# Patient Record
Sex: FEMALE | Race: WHITE | Employment: FULL TIME | URBAN - METROPOLITAN AREA
[De-identification: names, ages, dates, MRNs, and addresses within clinical notes are randomized per-mention and may not be internally consistent; named-entity substitution may affect disease eponyms.]

---

## 2022-03-18 PROBLEM — Z30.431 ENCOUNTER FOR ROUTINE CHECKING OF INTRAUTERINE CONTRACEPTIVE DEVICE (IUD): Status: ACTIVE | Noted: 2020-01-27

## 2022-03-18 PROBLEM — T83.32XA IUD THREADS LOST: Status: ACTIVE | Noted: 2021-01-27

## 2022-03-20 PROBLEM — N92.0 MENORRHAGIA: Status: ACTIVE | Noted: 2020-01-27

## 2023-03-01 ENCOUNTER — OFFICE VISIT (OUTPATIENT)
Dept: NEUROLOGY | Age: 53
End: 2023-03-01
Payer: COMMERCIAL

## 2023-03-01 VITALS
SYSTOLIC BLOOD PRESSURE: 152 MMHG | RESPIRATION RATE: 16 BRPM | DIASTOLIC BLOOD PRESSURE: 80 MMHG | HEIGHT: 67 IN | TEMPERATURE: 97.2 F | OXYGEN SATURATION: 99 % | HEART RATE: 100 BPM | BODY MASS INDEX: 33.52 KG/M2

## 2023-03-01 DIAGNOSIS — R20.2 PARESTHESIA OF BOTH FEET: Primary | ICD-10-CM

## 2023-03-01 DIAGNOSIS — R20.2 PARESTHESIA OF BOTH LEGS: ICD-10-CM

## 2023-03-01 PROCEDURE — 99204 OFFICE O/P NEW MOD 45 MIN: CPT | Performed by: PSYCHIATRY & NEUROLOGY

## 2023-03-01 RX ORDER — ESCITALOPRAM OXALATE 5 MG/1
TABLET ORAL
COMMUNITY
Start: 2023-02-16

## 2023-03-01 NOTE — PROGRESS NOTES
Chief Complaint   Patient presents with    New Patient     Patient presents with numbness and tingling bilateral legs and feet for 1 year,patient's PCP had patient do a brachial index test which was normal. Patient states she was recently put on anxiety medication and feels better now. She is a  and stands a lot, but when the numbness and tingling starts she must sit. Patient is accompanied by her .     Visit Vitals  BP (!) 152/80   Pulse 100   Temp 97.2 °F (36.2 °C)   Resp 16   Ht 5' 7\" (1.702 m)   SpO2 99%   BMI 33.52 kg/m²

## 2023-03-01 NOTE — PROGRESS NOTES
Joanne Davila (1970) is a 45 y.o. female, new patient, here for evaluation of the following     Chief complaint(s):   Chief Complaint   Patient presents with    New Patient     Patient presents with numbness and tingling bilateral legs and feet for 1 year,patient's PCP had patient do a brachial index test which was normal. Patient states she was recently put on anxiety medication and feels better now. She is a  and stands a lot, but when the numbness and tingling starts she must sit. Patient is accompanied by her . HPI: 45 y.o. female      Pt originally referred by her Previous PCP/ Dr Carola Choi in Dec 2022 for evaluation of numbness/ tingling in both feet. Has changed PCPs to [place], [place] and reports to Nurse today that symptoms involve feet and legs. Accompanied by  today     Pt reports for about 1 year had intermittent numbness/ tingling of all toes of both feet. Since Thanksgiving 2022, was constant/ daily when she was up/ moving around, but not when lying in bed or trying to sleep. Says she noticed that when she was in social settings, the feet paresthesias seemed to be worse. Started on Lexapro 2 weeks ago, and says the feet symptoms seem to be reduced since then. Non-diabetic. Denies any hx of excessive alcohol intake. Saw Podiatrist and tried changing shoes with better arch support but didn't improve symptoms.      Reviewed labs from 1/24/2022  CMP with mild elevated glucose 109, otherwise normal  CBC normal  Lipid panel: tchol 182, Trig 74, HDL 56,   A1c 5.4  TSH 2.70  Vitamin D 53.9  UA positive for UTI    Reviewed more recent labs 12/7/2022  CBC normal, magnesium 2.2, CMP with minimal elevated glucose 106, BNP 88      Review of Systems: Anxiety, high blood pressure, fatigue    ==================================================    No Known Allergies    Current Outpatient Medications   Medication Sig Dispense Refill    escitalopram oxalate (LEXAPRO) 5 mg tablet TAKE 1 TABLET BY MOUTH EVERY DAY FOR 30 DAYS      lisinopriL (PRINIVIL, ZESTRIL) 40 mg tablet Take 1 Tablet by mouth daily. 80 Tablet 1       Past Medical History:   Diagnosis Date    Hypercholesterolemia     Hypertension     Vitamin D deficiency        Past Surgical History:   Procedure Laterality Date    HX GYN      2        family history includes Heart Disease in her mother. reports that she has never smoked. She has never used smokeless tobacco. She reports current alcohol use of about 1.0 standard drink per week. She reports that she does not use drugs. PHYSICAL EXAM    Vitals:    23 1441   BP: (!) 152/80   Pulse: 100   Temp: 97.2 °F (36.2 °C)   Resp: 16   Height: 5' 7\" (1.702 m)   SpO2: 99%       Awake alert resting, conversant. EOMI. Visual fields are normal bilateral.  Face is symmetric. Hearing and speech are normal.  5 out of 5 strength in all extremities proximal and distal.  No resting or postural tremors. Normal finger-nose-finger on both sides. Intact light touch, pinprick, temperature, vibration in all extremities. Intact proprioception at the toes. Reflexes are 1+ biceps, 2+ left patellar, 1+ right patellar, 1+ Achilles. Stands and ambulates without difficulty. Tandem gait is normal.  Romberg exam is negative    ==================================================    ASSESSMENT/ PLAN:       ICD-10-CM ICD-9-CM    1. Paresthesia of both feet  R20.2 782.0 VITAMIN B12 & FOLATE      VITAMIN B12 & FOLATE      SPEP AND MARY, SERUM      SPEP AND MARY, SERUM      EMG LIMITED      2. Paresthesia of both legs  R20.2 782.0 VITAMIN B12 & FOLATE      VITAMIN B12 & FOLATE      SPEP AND MARY, SERUM      SPEP AND MARY, SERUM      EMG LIMITED        Normal neurologic exam.  Normal motor and sensory exam.  With normal exam, that makes large fiber neuropathy less likely, though doesn't exclude small fiber neuropathy.   Not RLS as symptoms are less when she's off feet and don't bother when lying in bed or trying to go to sleep. Check EMG/ NCS both legs, Vit B12/ Folate/ SPEP    Follow up to go over results      An electronic signature was used to authenticate this note.   -- Grazyna Swartz MD

## 2023-03-20 ENCOUNTER — OFFICE VISIT (OUTPATIENT)
Dept: NEUROLOGY | Age: 53
End: 2023-03-20
Payer: COMMERCIAL

## 2023-03-20 ENCOUNTER — TELEPHONE (OUTPATIENT)
Dept: NEUROLOGY | Age: 53
End: 2023-03-20

## 2023-03-20 DIAGNOSIS — R20.2 PARESTHESIA OF BOTH LEGS: ICD-10-CM

## 2023-03-20 DIAGNOSIS — R20.2 PARESTHESIA OF BOTH FEET: Primary | ICD-10-CM

## 2023-03-20 PROCEDURE — 95911 NRV CNDJ TEST 9-10 STUDIES: CPT | Performed by: PSYCHIATRY & NEUROLOGY

## 2023-03-20 PROCEDURE — 95886 MUSC TEST DONE W/N TEST COMP: CPT | Performed by: PSYCHIATRY & NEUROLOGY

## 2023-03-20 NOTE — PROCEDURES
.        EMG/ NCS Report  [facility]  - [facility]  [facility]  [facility], [address] [place], [address]   Ph: [phone]   FAX: [phone]/ [phone]    Test Date:  3/20/2023    Patient: Jonathan Álvarez : 1970 Physician: Gene Layton M.D. Sex: Female Height: ' \" Ref Phys: Gene Layton M.D.   ID#: [id number] Weight:  lbs. Technician: Beronica Bronson     Patient History:      CC: Numbness, tingling pain fab. feet for the last yr. EMG & NCV Findings:  Evaluation of the left Fibular motor and the right Fibular motor nerves showed normal distal onset latency (L4.1, R4.1 ms), normal amplitude (L4.4, R4.2 mV), normal conduction velocity (B Fib-Ankle, L46, R47 m/s), and normal conduction velocity (Poplt-B Fib, L50, R125 m/s). The left tibial motor and the right tibial motor nerves showed normal distal onset latency (L4.1, R4.7 ms), normal amplitude (L15.5, R16.4 mV), and normal conduction velocity (Knee-Ankle, L42, R49 m/s). The left Sup Fibular sensory, the right Sup Fibular sensory, the left sural sensory, and the right sural sensory nerves showed normal distal peak latency (L2.6, R2.8, L3.8, R3.6 ms) and normal amplitude (L11.5, R11.0, L20.0, R20.8 µV). Left vs. Right side comparison data for the Fibular motor nerve indicates abnormal L-R velocity difference (Poplt-B Fib, 75 m/s). All F Wave latencies were within normal limits. All F Wave left vs. right side latency differences were within normal limits. All H Reflex left vs. right side latency differences were within normal limits. Needle evaluation of the right extensor digitorum brevis muscle showed diminished recruitment and increased motor unit amplitude. All remaining muscles (as indicated in the following table) showed no evidence of electrical instability. Impression:    Normal bilateral lower motor and sensory nerve conduction studies.   Normal EMG of both lower extremities. No electrodiagnostic evidence of large fiber neuropathy, right or left lumbar motor radiculopathy. A normal nerve conduction study does not exclude the possibility of a small fiber neuropathy.   Consider skin biopsy if clinically indicated.    ___________________________  Carrie Strauss M.D.      Nerve Conduction Studies  Anti Sensory Summary Table     Stim Site NR Peak (ms) Norm Peak (ms) P-T Amp (µV) Norm P-T Amp Site1 Site2 Dist (cm)   Left Sup Fibular Anti Sensory (Lat ankle)  30.5 °C   Lower leg    2.6 <4.5 11.5 >5 Lower leg Lat ankle 10.0   Site 2    2.6  13.8       Right Sup Fibular Anti Sensory (Lat ankle)  30.1 °C   Lower leg    2.8 <4.5 11.0 >5 Lower leg Lat ankle 10.0   Site 2    2.7  12.8       Left Sural Anti Sensory (Lat Mall)  30.3 °C   Calf    3.8 <4.5 20.0 >4.0 Calf Lat Mall 14.0   Site 2    3.8  19.5       Right Sural Anti Sensory (Lat Mall)  30.1 °C   Calf    3.6 <4.5 20.8 >4.0 Calf Lat Mall 14.0   Site 2    3.4  22.0         Motor Summary Table     Stim Site NR Onset (ms) Norm Onset (ms) O-P Amp (mV) Norm O-P Amp Amp (Prev) (%) Site1 Site2 Dist (cm) Joce (m/s) Norm Joce (m/s)   Left Fibular Motor (Ext Dig Brev)  30.6 °C   Ankle    4.1 <6.5 4.4 >2.6 100.0 Ankle Ext Dig Brev 8.0     B Fib    11.0  3.9  88.6 B Fib Ankle 32.0 46 >38   Poplt    13.0  3.8  97.4 Poplt B Fib 10.0 50 >42   Right Fibular Motor (Ext Dig Brev)  30.1 °C   Ankle    4.1 <6.5 4.2 >2.6 100.0 Ankle Ext Dig Brev 8.0     B Fib    11.3  4.2  100.0 B Fib Ankle 34.0 47 >38   Poplt    12.1  4.4  104.8 Poplt B Fib 10.0 125 >42   Left Tibial Motor (Abd Ambrose Brev)  30.7 °C   Ankle    4.1 <6.1 15.5 >5.3 100.0 Ankle Abd Ambrose Brev 8.0     Knee    12.2  15.0  96.8 Knee Ankle 34.0 42 >39   Right Tibial Motor (Abd Ambrose Brev)  30.1 °C   Ankle    4.7 <6.1 16.4 >5.3 100.0 Ankle Abd Ambrose Brev 8.0     Knee    12.3  13.9  84.8 Knee Ankle 37.0 49 >39     F Wave Studies     NR F-Lat (ms) Lat Norm (ms) L-R F-Lat (ms) L-R Lat Norm   Left Tibial (Mrkrs) (Abd Hallucis)  30.8 °C      51.81 <56 1.17 <5.7   Right Tibial (Mrkrs) (Abd Hallucis)  30.1 °C      50.64 <56 1.17 <5.7     H Reflex Studies     NR H-Lat (ms) L-R H-Lat (ms) L-R Lat Norm   Left Tibial (Gastroc)  30.7 °C      34.26 0.00 <2.0   Right Tibial (Gastroc)  30.2 °C      34.26 0.00 <2.0     EMG     Side Muscle Nerve Root Ins Act Fibs Psw Recrt Duration Amp Poly Comment   Left Ext Dig Brev Dp Br Peron L5, S1 Nml Nml Nml Nml Nml Nml Nml    Left MedGastroc Tibial S1-2 Nml Nml Nml Nml Nml Nml Nml    Left AntTibialis Dp Br Peron L4-5 Nml Nml Nml Nml Nml Nml Nml    Left VastusMed Femoral L2-4 Nml Nml Nml Nml Nml Nml Nml    Left GluteusMax InfGluteal L5-S2 Nml Nml Nml Nml Nml Nml Nml    Left GluteusMed SupGluteal L4-S1 Nml Nml Nml Nml Nml Nml Nml    Left Lower Lumb Parasp Rami L5,S1 Nml Nml Nml Nml Nml Nml Nml    Right Ext Dig Brev Dp Br Peron L5, S1 Nml Nml Nml Reduced Nml Incr Nml    Right MedGastroc Tibial S1-2 Nml Nml Nml Nml Nml Nml Nml    Right AntTibialis Dp Br Peron L4-5 Nml Nml Nml Nml Nml Nml Nml    Right VastusMed Femoral L2-4 Nml Nml Nml Nml Nml Nml Nml    Right GluteusMax InfGluteal L5-S2 Nml Nml Nml Nml Nml Nml Nml    Right GluteusMed SupGluteal L4-S1 Nml Nml Nml Nml Nml Nml Nml    Right Lower Lumb Parasp Rami L5,S1 Nml Nml Nml Nml Nml Nml Nml      Waveforms: Epidermal Autograft Text: The defect edges were debeveled with a #15 scalpel blade.  Given the location of the defect, shape of the defect and the proximity to free margins an epidermal autograft was deemed most appropriate.  Using a sterile surgical marker, the primary defect shape was transferred to the donor site. The epidermal graft was then harvested.  The skin graft was then placed in the primary defect and oriented appropriately.

## 2023-07-26 DIAGNOSIS — I10 ESSENTIAL (PRIMARY) HYPERTENSION: ICD-10-CM

## 2023-07-29 RX ORDER — LISINOPRIL 40 MG/1
TABLET ORAL
Qty: 30 TABLET | Refills: 0 | Status: SHIPPED | OUTPATIENT
Start: 2023-07-29

## 2023-08-29 DIAGNOSIS — I10 ESSENTIAL (PRIMARY) HYPERTENSION: ICD-10-CM

## 2023-08-30 RX ORDER — LISINOPRIL 40 MG/1
TABLET ORAL
Qty: 30 TABLET | Refills: 0 | OUTPATIENT
Start: 2023-08-30

## 2023-08-30 NOTE — TELEPHONE ENCOUNTER
Patient has new PCP      For Pharmacy Admin Tracking Only    Program: Medication Refill  CPA in place:    Recommendation Provided To:    Intervention Detail: New Rx: 1, reason: Patient Preference  Intervention Accepted By:   Murali [place]?:    Time Spent (min): 5